# Patient Record
Sex: FEMALE | Race: BLACK OR AFRICAN AMERICAN | NOT HISPANIC OR LATINO | Employment: UNEMPLOYED | URBAN - METROPOLITAN AREA
[De-identification: names, ages, dates, MRNs, and addresses within clinical notes are randomized per-mention and may not be internally consistent; named-entity substitution may affect disease eponyms.]

---
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
## 2022-09-23 ENCOUNTER — HOSPITAL ENCOUNTER (EMERGENCY)
Facility: HOSPITAL | Age: 22
Discharge: HOME | End: 2022-09-23
Attending: EMERGENCY MEDICINE
Payer: MEDICAID

## 2022-09-23 VITALS
OXYGEN SATURATION: 98 % | HEART RATE: 91 BPM | DIASTOLIC BLOOD PRESSURE: 70 MMHG | SYSTOLIC BLOOD PRESSURE: 120 MMHG | TEMPERATURE: 100 F | RESPIRATION RATE: 18 BRPM | WEIGHT: 147 LBS

## 2022-09-23 DIAGNOSIS — H65.92 LEFT OTITIS MEDIA WITH EFFUSION: Primary | ICD-10-CM

## 2022-09-23 LAB — B-HCG UR QL: NEGATIVE

## 2022-09-23 PROCEDURE — 25000003 PHARM REV CODE 250: Mod: ER | Performed by: PHYSICIAN ASSISTANT

## 2022-09-23 PROCEDURE — 81025 URINE PREGNANCY TEST: CPT | Mod: ER | Performed by: PHYSICIAN ASSISTANT

## 2022-09-23 PROCEDURE — 99283 EMERGENCY DEPT VISIT LOW MDM: CPT | Mod: ER

## 2022-09-23 RX ORDER — IBUPROFEN 600 MG/1
600 TABLET ORAL
Status: COMPLETED | OUTPATIENT
Start: 2022-09-23 | End: 2022-09-23

## 2022-09-23 RX ORDER — AMOXICILLIN AND CLAVULANATE POTASSIUM 875; 125 MG/1; MG/1
1 TABLET, FILM COATED ORAL 2 TIMES DAILY
Qty: 20 TABLET | Refills: 0 | Status: SHIPPED | OUTPATIENT
Start: 2022-09-23 | End: 2022-10-03

## 2022-09-23 RX ADMIN — IBUPROFEN 600 MG: 600 TABLET, FILM COATED ORAL at 11:09

## 2022-09-23 NOTE — FIRST PROVIDER EVALUATION
Emergency Department TeleTriage Encounter Note      CHIEF COMPLAINT    Chief Complaint   Patient presents with    Otalgia     Left ear pain with drainage for 2 days.        VITAL SIGNS   Initial Vitals [09/23/22 1023]   BP Pulse Resp Temp SpO2   134/83 93 17 99.7 °F (37.6 °C) 99 %      MAP       --            ALLERGIES    Review of patient's allergies indicates:   Allergen Reactions    Pineapple        PROVIDER TRIAGE NOTE  Patient presents with complaint of left ear pain x 2 days, ear tender and + fluid drainage from ear, no bleeding.  She denies fever, headache or vomiting.     Will order motrin pending ED provider evaluation .       Initial orders will be placed and care will be transferred to an alternate provider when patient is roomed for a full evaluation. Any additional orders and the final disposition will be determined by that provider.         ORDERS  Labs Reviewed - No data to display    ED Orders (720h ago, onward)      None              Virtual Visit Note: The provider triage portion of this emergency department evaluation and documentation was performed via [employer], a HIPAA-compliant telemedicine application, in concert with a tele-presenter in the room. A face to face patient evaluation with one of my colleagues will occur once the patient is placed in an emergency department room.      DISCLAIMER: This note was prepared with [employer] voice recognition transcription software. Garbled syntax, mangled pronouns, and other bizarre constructions may be attributed to that software system.

## 2022-09-23 NOTE — ED PROVIDER NOTES
Encounter Date: 9/23/2022       History     Chief Complaint   Patient presents with    Otalgia     Left ear pain with drainage for 2 days.      21-year-old female presents ER for evaluation of left ear pain.  Ongoing for last couple days.  She has noticed fullness to the left ear.  Has noticed some clear drainage.  Has not had any fevers or chills.  No cough, congestion, URI symptoms.  Has not taken any medicine for symptoms.    The history is provided by the patient.   Review of patient's allergies indicates:   Allergen Reactions    Pineapple      Past Medical History:   Diagnosis Date    Asthma      Past Surgical History:   Procedure Laterality Date    BIOPSY OF PARATHYROID GLAND       History reviewed. No pertinent family history.  Social History     Tobacco Use    Smoking status: Never    Smokeless tobacco: Never   Substance Use Topics    Alcohol use: No    Drug use: No     Review of Systems   Constitutional:  Negative for chills and fever.   HENT:  Positive for ear discharge and ear pain.    Eyes:  Negative for visual disturbance.   Respiratory:  Negative for shortness of breath.    Cardiovascular:  Negative for chest pain.   Gastrointestinal:  Negative for nausea and vomiting.   Genitourinary:  Negative for dysuria and flank pain.   Musculoskeletal:  Negative for myalgias.   Skin:  Negative for rash.   Allergic/Immunologic: Negative for immunocompromised state.   Neurological:  Negative for weakness and numbness.   Hematological:  Does not bruise/bleed easily.   Psychiatric/Behavioral:  Negative for confusion.      Physical Exam     Initial Vitals [09/23/22 1023]   BP Pulse Resp Temp SpO2   134/83 93 17 99.7 °F (37.6 °C) 99 %      MAP       --         Physical Exam    Vitals reviewed.  Constitutional: She appears well-developed and well-nourished. She is not diaphoretic. No distress.   HENT:   Head: Normocephalic and atraumatic.   Right Ear: Tympanic membrane normal.   Left Ear: Tympanic membrane is  erythematous. A middle ear effusion is present.   Eyes: Conjunctivae and EOM are normal.   Neck: Neck supple.   Pulmonary/Chest: No respiratory distress.   Musculoskeletal:         General: Normal range of motion.      Cervical back: Neck supple.     Neurological: She is alert and oriented to person, place, and time.       ED Course   Procedures  Labs Reviewed   PREGNANCY TEST, URINE RAPID          Imaging Results    None          Medications   ibuprofen tablet 600 mg (600 mg Oral Given 9/23/22 1115)           APC / Resident Notes:   Patient seen in the ER promptly upon arrival.  She is afebrile, no acute distress.  Physical examination reveals left TM erythema with effusion.  External ear canal unremarkable.  No mastoid process tenderness.  Oropharynx is patent.  Consistent with serous otitis media.  Will cover with Augmentin.  Advised to finish full course of antibiotics.  Patient is to follow up with primary doctor this week.  Stable for discharge and close follow-up.    Disclaimer: This note has been generated using voice-recognition software. There may be typographical errors that have been missed during proof-reading.                       Clinical Impression:   Final diagnoses:  [H65.92] Left otitis media with effusion (Primary)      ED Disposition Condition    Discharge Stable          ED Prescriptions       Medication Sig Dispense Start Date End Date Auth. Provider    amoxicillin-clavulanate 875-125mg (AUGMENTIN) 875-125 mg per tablet Take 1 tablet by mouth 2 (two) times daily. for 10 days 20 tablet 9/23/2022 10/3/2022 Jessica Silver PA-C          Follow-up Information       Follow up With Specialties Details Why Contact Info    Jeremy Rodriges MD Pediatrics   [address]  [place] [address]  [phone]               Jessica Silver PA-C  09/23/22 1194